# Patient Record
Sex: MALE | Race: ASIAN | NOT HISPANIC OR LATINO | Employment: OTHER | URBAN - METROPOLITAN AREA
[De-identification: names, ages, dates, MRNs, and addresses within clinical notes are randomized per-mention and may not be internally consistent; named-entity substitution may affect disease eponyms.]

---

## 2023-04-04 ENCOUNTER — APPOINTMENT (OUTPATIENT)
Dept: GENERAL RADIOLOGY | Facility: CLINIC | Age: 73
End: 2023-04-04
Attending: EMERGENCY MEDICINE
Payer: COMMERCIAL

## 2023-04-04 ENCOUNTER — HOSPITAL ENCOUNTER (EMERGENCY)
Facility: CLINIC | Age: 73
Discharge: HOME | End: 2023-04-04
Attending: EMERGENCY MEDICINE | Admitting: EMERGENCY MEDICINE
Payer: COMMERCIAL

## 2023-04-04 ENCOUNTER — APPOINTMENT (OUTPATIENT)
Dept: CARDIOLOGY | Facility: CLINIC | Age: 73
End: 2023-04-04
Attending: EMERGENCY MEDICINE
Payer: COMMERCIAL

## 2023-04-04 ENCOUNTER — APPOINTMENT (OUTPATIENT)
Dept: CT IMAGING | Facility: CLINIC | Age: 73
End: 2023-04-04
Attending: EMERGENCY MEDICINE
Payer: COMMERCIAL

## 2023-04-04 VITALS
OXYGEN SATURATION: 100 % | HEART RATE: 80 BPM | WEIGHT: 141.98 LBS | RESPIRATION RATE: 11 BRPM | DIASTOLIC BLOOD PRESSURE: 50 MMHG | TEMPERATURE: 97 F | SYSTOLIC BLOOD PRESSURE: 120 MMHG

## 2023-04-04 DIAGNOSIS — I44.7 LBBB (LEFT BUNDLE BRANCH BLOCK): ICD-10-CM

## 2023-04-04 DIAGNOSIS — D64.9 ANEMIA, UNSPECIFIED TYPE: ICD-10-CM

## 2023-04-04 DIAGNOSIS — R55 SYNCOPE, UNSPECIFIED SYNCOPE TYPE: ICD-10-CM

## 2023-04-04 DIAGNOSIS — N28.9 IMPAIRED RENAL FUNCTION: ICD-10-CM

## 2023-04-04 DIAGNOSIS — R11.10 VOMITING, UNSPECIFIED VOMITING TYPE, UNSPECIFIED WHETHER NAUSEA PRESENT: ICD-10-CM

## 2023-04-04 LAB
ALBUMIN SERPL BCG-MCNC: 4.5 G/DL (ref 3.5–5.2)
ALP SERPL-CCNC: 64 U/L (ref 40–129)
ALT SERPL W P-5'-P-CCNC: 13 U/L (ref 10–50)
ANION GAP SERPL CALCULATED.3IONS-SCNC: 12 MMOL/L (ref 7–15)
AST SERPL W P-5'-P-CCNC: 18 U/L (ref 10–50)
BASOPHILS # BLD AUTO: 0.1 10E3/UL (ref 0–0.2)
BASOPHILS NFR BLD AUTO: 1 %
BILIRUB SERPL-MCNC: 0.5 MG/DL
BUN SERPL-MCNC: 21.7 MG/DL (ref 8–23)
CALCIUM SERPL-MCNC: 10.4 MG/DL (ref 8.8–10.2)
CHLORIDE SERPL-SCNC: 103 MMOL/L (ref 98–107)
CREAT SERPL-MCNC: 1.5 MG/DL (ref 0.67–1.17)
DEPRECATED HCO3 PLAS-SCNC: 25 MMOL/L (ref 22–29)
EOSINOPHIL # BLD AUTO: 0.2 10E3/UL (ref 0–0.7)
EOSINOPHIL NFR BLD AUTO: 2 %
ERYTHROCYTE [DISTWIDTH] IN BLOOD BY AUTOMATED COUNT: 13.2 % (ref 10–15)
ETHANOL SERPL-MCNC: <0.01 G/DL
GFR SERPL CREATININE-BSD FRML MDRD: 48 ML/MIN/1.73M2
GLUCOSE SERPL-MCNC: 164 MG/DL (ref 70–99)
HCT VFR BLD AUTO: 34.7 % (ref 40–53)
HGB BLD-MCNC: 11.6 G/DL (ref 13.3–17.7)
HOLD SPECIMEN: NORMAL
IMM GRANULOCYTES # BLD: 0 10E3/UL
IMM GRANULOCYTES NFR BLD: 0 %
LVEF ECHO: NORMAL
LYMPHOCYTES # BLD AUTO: 2.7 10E3/UL (ref 0.8–5.3)
LYMPHOCYTES NFR BLD AUTO: 28 %
MCH RBC QN AUTO: 30.6 PG (ref 26.5–33)
MCHC RBC AUTO-ENTMCNC: 33.4 G/DL (ref 31.5–36.5)
MCV RBC AUTO: 92 FL (ref 78–100)
MONOCYTES # BLD AUTO: 0.6 10E3/UL (ref 0–1.3)
MONOCYTES NFR BLD AUTO: 6 %
NEUTROPHILS # BLD AUTO: 6.2 10E3/UL (ref 1.6–8.3)
NEUTROPHILS NFR BLD AUTO: 63 %
NRBC # BLD AUTO: 0 10E3/UL
NRBC BLD AUTO-RTO: 0 /100
PLATELET # BLD AUTO: 225 10E3/UL (ref 150–450)
POTASSIUM SERPL-SCNC: 4.2 MMOL/L (ref 3.4–5.3)
PROT SERPL-MCNC: 7.2 G/DL (ref 6.4–8.3)
RBC # BLD AUTO: 3.79 10E6/UL (ref 4.4–5.9)
SODIUM SERPL-SCNC: 140 MMOL/L (ref 136–145)
TROPONIN T SERPL HS-MCNC: 11 NG/L
TROPONIN T SERPL HS-MCNC: 12 NG/L
WBC # BLD AUTO: 9.8 10E3/UL (ref 4–11)

## 2023-04-04 PROCEDURE — 99204 OFFICE O/P NEW MOD 45 MIN: CPT | Performed by: INTERNAL MEDICINE

## 2023-04-04 PROCEDURE — 250N000011 HC RX IP 250 OP 636: Performed by: EMERGENCY MEDICINE

## 2023-04-04 PROCEDURE — 82077 ASSAY SPEC XCP UR&BREATH IA: CPT | Performed by: EMERGENCY MEDICINE

## 2023-04-04 PROCEDURE — 93005 ELECTROCARDIOGRAM TRACING: CPT

## 2023-04-04 PROCEDURE — 71046 X-RAY EXAM CHEST 2 VIEWS: CPT

## 2023-04-04 PROCEDURE — 80053 COMPREHEN METABOLIC PANEL: CPT | Performed by: EMERGENCY MEDICINE

## 2023-04-04 PROCEDURE — 99285 EMERGENCY DEPT VISIT HI MDM: CPT | Mod: 25

## 2023-04-04 PROCEDURE — 36415 COLL VENOUS BLD VENIPUNCTURE: CPT | Performed by: EMERGENCY MEDICINE

## 2023-04-04 PROCEDURE — 84484 ASSAY OF TROPONIN QUANT: CPT | Performed by: EMERGENCY MEDICINE

## 2023-04-04 PROCEDURE — 85004 AUTOMATED DIFF WBC COUNT: CPT | Performed by: EMERGENCY MEDICINE

## 2023-04-04 PROCEDURE — 93306 TTE W/DOPPLER COMPLETE: CPT

## 2023-04-04 PROCEDURE — 93306 TTE W/DOPPLER COMPLETE: CPT | Mod: 26 | Performed by: INTERNAL MEDICINE

## 2023-04-04 PROCEDURE — 96374 THER/PROPH/DIAG INJ IV PUSH: CPT

## 2023-04-04 PROCEDURE — 96361 HYDRATE IV INFUSION ADD-ON: CPT

## 2023-04-04 PROCEDURE — 258N000003 HC RX IP 258 OP 636: Performed by: EMERGENCY MEDICINE

## 2023-04-04 PROCEDURE — 93005 ELECTROCARDIOGRAM TRACING: CPT | Mod: 76

## 2023-04-04 PROCEDURE — 70450 CT HEAD/BRAIN W/O DYE: CPT

## 2023-04-04 RX ORDER — ONDANSETRON 2 MG/ML
4 INJECTION INTRAMUSCULAR; INTRAVENOUS ONCE
Status: COMPLETED | OUTPATIENT
Start: 2023-04-04 | End: 2023-04-04

## 2023-04-04 RX ORDER — SODIUM CHLORIDE 9 MG/ML
INJECTION, SOLUTION INTRAVENOUS CONTINUOUS
Status: DISCONTINUED | OUTPATIENT
Start: 2023-04-04 | End: 2023-04-04 | Stop reason: HOSPADM

## 2023-04-04 RX ADMIN — ONDANSETRON 4 MG: 2 INJECTION INTRAMUSCULAR; INTRAVENOUS at 11:34

## 2023-04-04 RX ADMIN — SODIUM CHLORIDE 1000 ML: 9 INJECTION, SOLUTION INTRAVENOUS at 11:33

## 2023-04-04 ASSESSMENT — ACTIVITIES OF DAILY LIVING (ADL)
ADLS_ACUITY_SCORE: 35

## 2023-04-04 NOTE — ED TRIAGE NOTES
Pt BIBA from airport. Pt had one glass of scotch, stood up and had syncopal event. Pt had 4 more syncopal events samanhta route. Pt also vomited en route.      Triage Assessment     Row Name 04/04/23 1128       Triage Assessment (Adult)    Airway WDL WDL       Respiratory WDL    Respiratory WDL WDL       Skin Circulation/Temperature WDL    Skin Circulation/Temperature WDL WDL       Cardiac WDL    Cardiac WDL WDL       Peripheral/Neurovascular WDL    Peripheral Neurovascular WDL WDL       Cognitive/Neuro/Behavioral WDL    Cognitive/Neuro/Behavioral WDL WDL

## 2023-04-04 NOTE — ED NOTES
Bed: ST01  Expected date:   Expected time:   Means of arrival:   Comments:  Angel - 515 - 73 M heart block eta 1126

## 2023-04-04 NOTE — CONSULTS
Cardio consult dictated  Discussed with er md  Other than LBBB no documented conduction disturbance  Echo c/w lbbb no other findings  Neg carotid sinus massage  Johnson ferro etc would cause syncope but not usually n/v  Call us if troponin #2 positive  I will sign off  ?vaso vagal from gi upset-see dictation

## 2023-04-04 NOTE — CONSULTS
Consult Date: 04/04/2023    REASON FOR CONSULTATION:  Syncope.    HISTORY OF PRESENT ILLNESS:  This is a very pleasant 73-year-old [place] gentleman. I was called urgently to the ER to see this patient.  He is from out of town.  He is a retired .  He lives in the [place] area, but he has a son here in [place], [place], and he has family in [place].  He was in the [place] airport today, getting ready to get on a plane to go to [place].     He states he had a normal breakfast this morning.  He did not check his morning blood sugar.  He went to the bar, had 1 Francesco Walker Red.  He states that this is very unusual for him.  He rarely drinks alcohol.  He also had john juice, I believe, and I do not know if he had food, but I think he might have. Apparently 30 minutes after this drink, he became very nauseated, very dizzy.  He slid down to the ground.  He probably passed out briefly, and he had a couple other episodes of near syncope or syncope, all very brief.  Enroute, there were no abnormalities identified in the blood pressure or EKG according to the paramedics.  He is perfectly fine here in the ER.    He did vomit during this episode, and he was aware that something was happening.  He was on his way to the bathroom when he got dizzy.  He did not lose control of bowel or bladder.  No seizure activity, no chest pain, no shortness of breath.  He states he had an episode similar to this some years ago when he was diagnosed with H. pylori, and I think he might have had a small outlet obstruction.       All of his medical care is normally from [place].  He states he has a cardiologist in [place] that he sees for hypertension and hyperlipidemia.  He has never been diagnosed with coronary disease, did not have a stress test, but had echocardiograms in the past. As far as he knows, they were normal.  Of note, here his EKG shows left bundle branch block, and he is not familiar with  "that term, so we do not know if it never existed before. We cannot access old EKGs.     PAST MEDICAL HISTORY:    1.  Leg polio as a youth and then left ankle surgery fusion because of polio.  2.  Diabetes.  3.  Hypertension.  4.  Hyperlipidemia.  5.  Osteoporosis.  6.  H. pylori.  7.  BPH.    MEDICATIONS:  Aspirin, vitamin D, carvedilol, pioglitazone, Januvia and metformin.  He is on PPI medicine, losartan, alendronate, tamsulosin.    ALLERGIES:  None.    FAMILY HISTORY:  Mother  at age 83 of \"old age\".  Father  with history of a CVA.  There is no family history of other coronary disease.    REVIEW OF SYSTEMS:  Completely negative as above.  He states he was in good health.  He was on his way to visit another family member when this all occurred.    SOCIAL HISTORY:  , from [place], , retired.  Never smoker.  Alcohol intake is rare, but had alcohol this morning.  Caffeine is very mild.    PHYSICAL EXAMINATION:    GENERAL:  Pleasant gentleman, in no apparent distress.  He is of slight build.  VITAL SIGNS:  Supine blood pressure 117/60 with heart rate of 81.  Standing 125/57, heart rate of 93. He is in sinus rhythm.  SKIN:  No petechia or rash.  HEENT:  Eyes nonicteric.  NECK:  Supple, without thyromegaly or adenopathy.  CHEST:  Clear.  CARDIAC:  Regular rate and rhythm.  Distant heart tones.  Minimal pectus. There may be some split of S2, which would be expected with left bundle. There is no murmur.  Carotid pulses are 2+, femoral pulses 2+.  There are no bruits.  Neck veins flat.  ABDOMEN:  Benign.  There is no organomegaly, no tenderness.  Abdominal aorta is not palpably enlarged.  EXTREMITIES:  No cyanosis, clubbing, or edema.  Pulses are intact and even on both left and right side.  NEUROLOGIC:  Alert and oriented.  Cranial nerves II-XII intact.  Motor and sensory intact.    DIAGNOSTICS:  EKG demonstrates sinus rhythm with left bundle branch block.  Of note, there is concordance of " the T-wave in V5 and V6, i.e., that the QRS is upright and so is the T-wave, which is a little unusual for a left bundle.     Echocardiogram was performed.  Preliminarily, it shows abnormal septal motion consistent with left bundle branch block and LVEF at lower limits of normal, but no regional wall motion abnormalities, no valvular heart disease.  No pericardial effusion and the portions of the aorta that can be imaged on an echo are normal.      LABS:  Sodium 140, potassium 4.2, creatinine 1.50, we do not have a baseline. Calcium slightly high at 10.4 with a normal albumin of 4.5.  Liver panel was negative.  Blood sugar 164.  Troponin #1 negative.  CBC is notable for hemoglobin slightly low at 11.6, keeping in mind the patient does have possibly chronic renal insufficiency.  MCV is 92.  White count 9.8, platelet count 225,000.    IMAGING:  Chest x-ray reports no active cardiopulmonary disease.  Head CT scan reports no acute findings, soft tissue swelling overlying left occiput.  I do not know if he struck his head when he passed out.      Blood alcohol level was reported at 0, which is confusing because he had a drink 30 minutes before he passed out, and although he vomited, one would have thought 30 minutes would have been enough to be at least partially absorbed.    IMPRESSION AND PLAN: At this point, I do not have a specific cardiac diagnosis.  He does have a history of syncope when he had H. pylori with perhaps some gastrointestinal outlet obstruction.  He was drinking alcohol, although it was 1 drink and the level came back at 0.  There is no clear reason to believe this was an adulterated drink.  I did do carotid sinus massage.  It was negative.  He is not orthostatic, never had chest pain.  Troponin #1 negative.  Echocardiogram was mostly just consistent with left bundle.      At this time, I probably would watch him for several hours and get 1 more troponin to be sure it is negative. I will leave it to  the ER if they want to get an internal medicine consult.      Please call cardiology if there is anything more we can do.  Perhaps this was a vasovagal episode from being nauseated, but again the question is why was he nauseated?   He does not have any recent gastrointestinal symptoms and he is on chronic proton pump inhibitor.  Again, internal medicine input may be useful.    Reyes Conrad MD        D: 2023   T: 2023   MT: Janice    Name:     MARQUEZ CANALES  MRN:      [id number]        Account:      [id number]   :      1950           Consult Date: 2023     Document: [id number]

## 2023-04-04 NOTE — ED PROVIDER NOTES
History     Chief Complaint:  Syncope       HPI   Solomon Donaldson is a 73 year old male with a history of hypertension, diabetes who presents with episode of dizziness and syncope.  Patient reports that he was feeling well this morning when he went to the airport on his way back home.  He had been visiting family in [place].  He had a drink of scotch and then went up to go to the bathroom.  On the way there he felt dizzy and slowly fell to the ground on his butt.  He thinks he may have lost consciousness for short period of time.  He denies any headache or chest pain or palpitations or shortness of breath during the episode.  In route to the hospital he developed nausea and vomiting.  Paramedics reported transient bradycardia in route and brought him to the stabilization room.  Upon arrival, he denies any ongoing symptoms.  He reports baseline left leg weakness from polio.  He reiterates no recent illnesses recently including fever, cough, vomiting, diarrhea.  No new vision changes or numbness or weakness in extremities.        ROS:  Review of Systems  A 10 point ROS was obtained and negative except as noted here and in HPI    Allergies:  No Known Allergies     Medications:    No current outpatient medications on file.      Past Medical History:    No past medical history on file.    Past Surgical History:    No past surgical history on file.     Family History:    family history is not on file.    Social History:     PCP: No primary care provider on file.     Physical Exam     Patient Vitals for the past 24 hrs:   BP Temp Temp src Pulse Resp SpO2 Weight   04/04/23 1136 120/50 97  F (36.1  C) Temporal 80 11 100 % --   04/04/23 1134 125/59 -- -- 70 18 100 % 64.4 kg (141 lb 15.6 oz)        Physical Exam  VS: Reviewed per above  HENT: Mucous membranes moist, no nuchal rigidity  EYES: sclera anicteric  CV: Rate as noted, regular rhythm.   RESP: Effort normal. Breath sounds are normal bilaterally.  GI: no  tenderness/rebound/guarding, not distended.  NEURO: GCS 15, cranial nerves II through XII are intact, 5 out of 5 strength in all 4 extremities, sensation is intact light touch in all 4 extremities  MSK: No deformity of the extremities  SKIN: Warm and dry      Emergency Department Course   ECG 1: ECG obtained 4/4/23 at 11:28 AM.  Normal sinus rhythm.  Left bundle branch block.  Abnormal ECG.  Ventricular rate 70 bpm.  SD interval 198 ms.  QRS duration 140 ms.  QT/QTc 438/473 ms.  P-RR-T axes 79, 46, 81.  No previous ECG for comparison.  Interpreted by Hoang Barry MD at 11:29 AM on 4/4/2023.    ECG 2: no change appreciated compared to first ecg interpreted by Hoang Barry MD [id number].      Imaging:  Echocardiogram Complete   Final Result      Head CT w/o contrast   Final Result   IMPRESSION:   1. No CT findings of acute intracranial process.   2. Brain atrophy and presumed chronic small vessel ischemic changes,   as described.   3. Mild soft tissue swelling overlying the left occipital calvarium,   which may represent soft tissue contusion. No underlying calvarial   fracture.      ANDRE LLOYD MD            SYSTEM ID:  [id number]      XR Chest 2 Views   Final Result   IMPRESSION: No acute cardiopulmonary process.      BRIE BROWNING MD            SYSTEM ID:  [id number]         Report per radiology    Laboratory:  Labs Ordered and Resulted from Time of ED Arrival to Time of ED Departure   COMPREHENSIVE METABOLIC PANEL - Abnormal       Result Value    Sodium 140      Potassium 4.2      Chloride 103      Carbon Dioxide (CO2) 25      Anion Gap 12      Urea Nitrogen 21.7      Creatinine 1.50 (*)     Calcium 10.4 (*)     Glucose 164 (*)     Alkaline Phosphatase 64      AST 18      ALT 13      Protein Total 7.2      Albumin 4.5      Bilirubin Total 0.5      GFR Estimate 48 (*)    CBC WITH PLATELETS AND DIFFERENTIAL - Abnormal    WBC Count 9.8      RBC Count 3.79 (*)     Hemoglobin 11.6 (*)     Hematocrit 34.7 (*)      MCV 92      MCH 30.6      MCHC 33.4      RDW 13.2      Platelet Count 225      % Neutrophils 63      % Lymphocytes 28      % Monocytes 6      % Eosinophils 2      % Basophils 1      % Immature Granulocytes 0      NRBCs per 100 WBC 0      Absolute Neutrophils 6.2      Absolute Lymphocytes 2.7      Absolute Monocytes 0.6      Absolute Eosinophils 0.2      Absolute Basophils 0.1      Absolute Immature Granulocytes 0.0      Absolute NRBCs 0.0     TROPONIN T, HIGH SENSITIVITY - Normal    Troponin T, High Sensitivity 12     ETHYL ALCOHOL LEVEL - Normal    Alcohol ethyl <0.01     TROPONIN T, HIGH SENSITIVITY - Normal    Troponin T, High Sensitivity 11          Emergency Department Course & Assessments:             Interventions:  Medications   0.9% sodium chloride BOLUS (0 mLs Intravenous Stopped 4/4/23 2269)     Followed by   sodium chloride 0.9% infusion (has no administration in time range)   ondansetron (ZOFRAN) injection 4 mg (4 mg Intravenous $Given 4/4/23 7040)             Disposition:  The patient was discharged to home.     Impression & Plan        Medical Decision Making:  Patient presents to the ER for evaluation of episode of dizziness, near syncope versus syncope, vomiting.  Here in the ER he is feeling well.  No focal neurodeficits or headache to suggest stroke or ICH.  Furthermore noncontrast head CT negative for acute process.  ECG revealed left bundle branch block of unclear chronicity.  There was concordant ST elevation in V5 and thus I discussed the case with cardiology.  Recommendation was for stat echocardiogram and serial troponin testing.  Fortunately no wall motion abnormality detected on echocardiogram and serial troponin testing was negative.  At this time no evidence for ACS.  Chest x-ray was clear as well.  Other labs reassuring aside from anemia of unclear chronicity and elevated creatinine of unclear chronicity.  He was given IV fluids.  No history supportive of occult blood loss.  Patient  tolerated p.o.  Offered further hospital admission for observation in the setting of unexplained syncope.  Discussed the case with patient's son as well over the phone.  Ultimately patient preferred discharge at this juncture with outpatient follow-up upon return home to [place].  Return precautions discussed prior to discharge.      Diagnosis:    ICD-10-CM    1. LBBB (left bundle branch block)  I44.7       2. Syncope, unspecified syncope type  R55       3. Vomiting, unspecified vomiting type, unspecified whether nausea present  R11.10       4. Anemia, unspecified type  D64.9       5. Impaired renal function  N28.9            Discharge Medications:  There are no discharge medications for this patient.          Hoang Brary MD  04/04/23 1909

## 2023-04-06 LAB
ATRIAL RATE - MUSE: 70 BPM
ATRIAL RATE - MUSE: 86 BPM
DIASTOLIC BLOOD PRESSURE - MUSE: NORMAL MMHG
DIASTOLIC BLOOD PRESSURE - MUSE: NORMAL MMHG
INTERPRETATION ECG - MUSE: NORMAL
INTERPRETATION ECG - MUSE: NORMAL
P AXIS - MUSE: 70 DEGREES
P AXIS - MUSE: 79 DEGREES
PR INTERVAL - MUSE: 192 MS
PR INTERVAL - MUSE: 198 MS
QRS DURATION - MUSE: 126 MS
QRS DURATION - MUSE: 140 MS
QT - MUSE: 414 MS
QT - MUSE: 438 MS
QTC - MUSE: 473 MS
QTC - MUSE: 495 MS
R AXIS - MUSE: 46 DEGREES
R AXIS - MUSE: 47 DEGREES
SYSTOLIC BLOOD PRESSURE - MUSE: NORMAL MMHG
SYSTOLIC BLOOD PRESSURE - MUSE: NORMAL MMHG
T AXIS - MUSE: 74 DEGREES
T AXIS - MUSE: 81 DEGREES
VENTRICULAR RATE- MUSE: 70 BPM
VENTRICULAR RATE- MUSE: 86 BPM

## 2023-05-21 ENCOUNTER — HEALTH MAINTENANCE LETTER (OUTPATIENT)
Age: 73
End: 2023-05-21

## 2024-07-28 ENCOUNTER — HEALTH MAINTENANCE LETTER (OUTPATIENT)
Age: 74
End: 2024-07-28

## 2025-08-10 ENCOUNTER — HEALTH MAINTENANCE LETTER (OUTPATIENT)
Age: 75
End: 2025-08-10